# Patient Record
Sex: FEMALE | Race: WHITE | URBAN - METROPOLITAN AREA
[De-identification: names, ages, dates, MRNs, and addresses within clinical notes are randomized per-mention and may not be internally consistent; named-entity substitution may affect disease eponyms.]

---

## 2019-04-02 ENCOUNTER — OFFICE VISIT (OUTPATIENT)
Dept: FAMILY MEDICINE CLINIC | Age: 29
End: 2019-04-02
Payer: COMMERCIAL

## 2019-04-02 VITALS
RESPIRATION RATE: 18 BRPM | SYSTOLIC BLOOD PRESSURE: 122 MMHG | BODY MASS INDEX: 37.56 KG/M2 | OXYGEN SATURATION: 99 % | DIASTOLIC BLOOD PRESSURE: 72 MMHG | WEIGHT: 220 LBS | TEMPERATURE: 97.7 F | HEIGHT: 64 IN | HEART RATE: 69 BPM

## 2019-04-02 DIAGNOSIS — H66.92 ACUTE OTITIS MEDIA, LEFT: Primary | ICD-10-CM

## 2019-04-02 DIAGNOSIS — Z20.818 EXPOSURE TO STREP THROAT: ICD-10-CM

## 2019-04-02 DIAGNOSIS — J02.0 ACUTE STREPTOCOCCAL PHARYNGITIS: ICD-10-CM

## 2019-04-02 LAB — S PYO AG THROAT QL: POSITIVE

## 2019-04-02 PROCEDURE — 87880 STREP A ASSAY W/OPTIC: CPT | Performed by: NURSE PRACTITIONER

## 2019-04-02 PROCEDURE — 99203 OFFICE O/P NEW LOW 30 MIN: CPT | Performed by: NURSE PRACTITIONER

## 2019-04-02 RX ORDER — CLINDAMYCIN PALMITATE HYDROCHLORIDE 75 MG/5ML
450 SOLUTION ORAL 3 TIMES DAILY
Qty: 900 ML | Refills: 0 | Status: SHIPPED | OUTPATIENT
Start: 2019-04-02 | End: 2019-04-02 | Stop reason: CLARIF

## 2019-04-02 RX ORDER — AMOXICILLIN 875 MG/1
875 TABLET, COATED ORAL 2 TIMES DAILY
Qty: 20 TABLET | Refills: 0 | Status: SHIPPED | OUTPATIENT
Start: 2019-04-02 | End: 2019-04-12

## 2019-04-02 ASSESSMENT — ENCOUNTER SYMPTOMS
COUGH: 1
CHEST TIGHTNESS: 0
EYE REDNESS: 0
WHEEZING: 0
SINUS PRESSURE: 0
EYE DISCHARGE: 0
VOICE CHANGE: 0
SORE THROAT: 1
SHORTNESS OF BREATH: 0

## 2021-11-05 NOTE — PATIENT INSTRUCTIONS
Patient Education        Strep Throat: Care Instructions  Your Care Instructions    Strep throat is a bacterial infection that causes sudden, severe sore throat and fever. Strep throat, which is caused by bacteria called streptococcus, is treated with antibiotics. Sometimes a strep test is necessary to tell if the sore throat is caused by strep bacteria. Treatment can help ease symptoms and may prevent future problems. Follow-up care is a key part of your treatment and safety. Be sure to make and go to all appointments, and call your doctor if you are having problems. It's also a good idea to know your test results and keep a list of the medicines you take. How can you care for yourself at home? · Take your antibiotics as directed. Do not stop taking them just because you feel better. You need to take the full course of antibiotics. · Strep throat can spread to others until 24 hours after you begin taking antibiotics. During this time, you should avoid contact with other people at work or home, especially infants and children. Do not sneeze or cough on others, and wash your hands often. Keep your drinking glass and eating utensils separate from those of others, and wash these items well in hot, soapy water. · Gargle with warm salt water at least once each hour to help reduce swelling and make your throat feel better. Use 1 teaspoon of salt mixed in 8 fluid ounces of warm water. · Take an over-the-counter pain medication, such as acetaminophen (Tylenol), ibuprofen (Advil, Motrin), or naproxen (Aleve). Read and follow all instructions on the label. · Try an over-the-counter anesthetic throat spray or throat lozenges, which may help relieve throat pain. · Drink plenty of fluids. Fluids may help soothe an irritated throat. Hot fluids, such as tea or soup, may help your throat feel better. · Eat soft solids and drink plenty of clear liquids.  Flavored ice pops, ice cream, scrambled eggs, sherbet, and gelatin dessert (such as Jell-O) may also soothe the throat. · Get lots of rest.  · Do not smoke, and avoid secondhand smoke. If you need help quitting, talk to your doctor about stop-smoking programs and medicines. These can increase your chances of quitting for good. · Use a vaporizer or humidifier to add moisture to the air in your bedroom. Follow the directions for cleaning the machine. When should you call for help? Call your doctor now or seek immediate medical care if:    · You have a new or higher fever.     · You have a fever with a stiff neck or severe headache.     · You have new or worse trouble swallowing.     · Your sore throat gets much worse on one side.     · Your pain becomes much worse on one side of your throat.    Watch closely for changes in your health, and be sure to contact your doctor if:    · You are not getting better after 2 days (48 hours).     · You do not get better as expected. Where can you learn more? Go to https://"[employer]".[employer]. org and sign in to your textPlus account. Enter K625 in the [employer] box to learn more about \"Strep Throat: Care Instructions. \"     If you do not have an account, please click on the \"Sign Up Now\" link. Current as of: March 27, 2018  Content Version: 11.9  © 1997-3336 [employer]. Care instructions adapted under license by [facility] ([facility]). If you have questions about a medical condition or this instruction, always ask your healthcare professional. [place] [address] any warranty or liability for your use of this information. Patient Education        Ear Infection (Otitis Media): Care Instructions  Your Care Instructions    An ear infection may start with a cold and affect the middle ear (otitis media). It can hurt a lot. Most ear infections clear up on their own in a couple of days. Most often you will not need antibiotics. This is because many ear infections are caused by a virus.  Antibiotics don't work against a virus. Regular doses of pain medicines are the best way to reduce your fever and help you feel better. Follow-up care is a key part of your treatment and safety. Be sure to make and go to all appointments, and call your doctor if you are having problems. It's also a good idea to know your test results and keep a list of the medicines you take. How can you care for yourself at home? · Take pain medicines exactly as directed. ? If the doctor gave you a prescription medicine for pain, take it as prescribed. ? If you are not taking a prescription pain medicine, take an over-the-counter medicine, such as acetaminophen (Tylenol), ibuprofen (Advil, Motrin), or naproxen (Aleve). Read and follow all instructions on the label. ? Do not take two or more pain medicines at the same time unless the doctor told you to. Many pain medicines have acetaminophen, which is Tylenol. Too much acetaminophen (Tylenol) can be harmful. · Plan to take a full dose of pain reliever before bedtime. Getting enough sleep will help you get better. · Try a warm, moist washcloth on the ear. It may help relieve pain. · If your doctor prescribed antibiotics, take them as directed. Do not stop taking them just because you feel better. You need to take the full course of antibiotics. When should you call for help? Call your doctor now or seek immediate medical care if:    · You have new or increasing ear pain.     · You have new or increasing pus or blood draining from your ear.     · You have a fever with a stiff neck or a severe headache.    Watch closely for changes in your health, and be sure to contact your doctor if:    · You have new or worse symptoms.     · You are not getting better after taking an antibiotic for 2 days. Where can you learn more? Go to https://chricoeb.[employer]. org and sign in to your [employer] account.  Enter H334 in the [employer] box to learn more about \"Ear Infection (Otitis Media): Care Instructions. \"     If you do not have an account, please click on the \"Sign Up Now\" link. Current as of: March 27, 2018  Content Version: 11.9  © 1390-7898 [employer]. Care instructions adapted under license by [facility] [address] If you have questions about a medical condition or this instruction, always ask your healthcare professional. [place] [address] any warranty or liability for your use of this information. no
